# Patient Record
Sex: MALE | Race: WHITE | NOT HISPANIC OR LATINO | URBAN - METROPOLITAN AREA
[De-identification: names, ages, dates, MRNs, and addresses within clinical notes are randomized per-mention and may not be internally consistent; named-entity substitution may affect disease eponyms.]

---

## 2017-08-08 ENCOUNTER — APPOINTMENT (RX ONLY)
Dept: URBAN - METROPOLITAN AREA OTHER 10 | Facility: OTHER | Age: 57
Setting detail: DERMATOLOGY
End: 2017-08-08

## 2017-08-08 DIAGNOSIS — L20.89 OTHER ATOPIC DERMATITIS: ICD-10-CM

## 2017-08-08 PROBLEM — L55.1 SUNBURN OF SECOND DEGREE: Status: ACTIVE | Noted: 2017-08-08

## 2017-08-08 PROBLEM — I10 ESSENTIAL (PRIMARY) HYPERTENSION: Status: ACTIVE | Noted: 2017-08-08

## 2017-08-08 PROBLEM — E13.9 OTHER SPECIFIED DIABETES MELLITUS WITHOUT COMPLICATIONS: Status: ACTIVE | Noted: 2017-08-08

## 2017-08-08 PROBLEM — L20.84 INTRINSIC (ALLERGIC) ECZEMA: Status: ACTIVE | Noted: 2017-08-08

## 2017-08-08 PROCEDURE — 99202 OFFICE O/P NEW SF 15 MIN: CPT

## 2017-08-08 PROCEDURE — ? COUNSELING

## 2017-08-08 PROCEDURE — ? TREATMENT REGIMEN

## 2017-08-08 PROCEDURE — ? PRESCRIPTION

## 2017-08-08 RX ORDER — TRIAMCINOLONE ACETONIDE 1 MG/G
CREAM TOPICAL BID X 2 WEEKS
Qty: 1 | Refills: 1 | Status: ERX | COMMUNITY
Start: 2017-08-08

## 2017-08-08 RX ADMIN — TRIAMCINOLONE ACETONIDE: 1 CREAM TOPICAL at 15:26

## 2017-08-08 ASSESSMENT — LOCATION SIMPLE DESCRIPTION DERM
LOCATION SIMPLE: RIGHT PRETIBIAL REGION
LOCATION SIMPLE: LEFT PRETIBIAL REGION

## 2017-08-08 ASSESSMENT — LOCATION DETAILED DESCRIPTION DERM
LOCATION DETAILED: LEFT PROXIMAL PRETIBIAL REGION
LOCATION DETAILED: RIGHT DISTAL PRETIBIAL REGION

## 2017-08-08 ASSESSMENT — LOCATION ZONE DERM: LOCATION ZONE: LEG

## 2017-08-08 NOTE — PROCEDURE: TREATMENT REGIMEN
Continue Regimen: Triamcinolone creAm
Detail Level: Zone
Otc Regimen: Aveeno lotion\\nCeraVe anti itch

## 2021-08-23 ENCOUNTER — OFFICE VISIT (OUTPATIENT)
Dept: URBAN - METROPOLITAN AREA CLINIC 74 | Facility: CLINIC | Age: 61
End: 2021-08-23

## 2021-08-23 RX ORDER — LISINOPRIL 10 MG/1
1 TABLET TABLET ORAL ONCE A DAY
Status: ACTIVE | COMMUNITY

## 2021-08-23 RX ORDER — GABAPENTIN 600 MG/1
1 TABLET TABLET, FILM COATED ORAL ONCE A DAY
Status: ACTIVE | COMMUNITY

## 2021-08-23 RX ORDER — SITAGLIPTIN 100 MG/1
1 TABLET TABLET, FILM COATED ORAL ONCE A DAY
Status: ACTIVE | COMMUNITY

## 2021-08-23 RX ORDER — METFORMIN HYDROCHLORIDE 1000 MG/1
1 TABLET WITH A MEAL TABLET, FILM COATED ORAL BID
Status: ACTIVE | COMMUNITY

## 2021-10-18 ENCOUNTER — LAB OUTSIDE AN ENCOUNTER (OUTPATIENT)
Dept: URBAN - METROPOLITAN AREA CLINIC 74 | Facility: CLINIC | Age: 61
End: 2021-10-18

## 2021-10-18 ENCOUNTER — OFFICE VISIT (OUTPATIENT)
Dept: URBAN - METROPOLITAN AREA CLINIC 74 | Facility: CLINIC | Age: 61
End: 2021-10-18
Payer: COMMERCIAL

## 2021-10-18 ENCOUNTER — TELEPHONE ENCOUNTER (OUTPATIENT)
Dept: URBAN - METROPOLITAN AREA CLINIC 74 | Facility: CLINIC | Age: 61
End: 2021-10-18

## 2021-10-18 VITALS
SYSTOLIC BLOOD PRESSURE: 140 MMHG | TEMPERATURE: 98.1 F | WEIGHT: 224 LBS | BODY MASS INDEX: 32.07 KG/M2 | HEIGHT: 70 IN | DIASTOLIC BLOOD PRESSURE: 80 MMHG | HEART RATE: 89 BPM

## 2021-10-18 DIAGNOSIS — K74.69 OTHER CIRRHOSIS OF LIVER: ICD-10-CM

## 2021-10-18 DIAGNOSIS — D69.6 THROMBOCYTOPENIA: ICD-10-CM

## 2021-10-18 DIAGNOSIS — R74.8 ABNORMAL LIVER ENZYMES: ICD-10-CM

## 2021-10-18 DIAGNOSIS — K76.0 FATTY LIVER: ICD-10-CM

## 2021-10-18 PROBLEM — 267532001: Status: ACTIVE | Noted: 2021-10-18

## 2021-10-18 PROCEDURE — 99244 OFF/OP CNSLTJ NEW/EST MOD 40: CPT | Performed by: INTERNAL MEDICINE

## 2021-10-18 RX ORDER — METFORMIN HYDROCHLORIDE 1000 MG/1
1 TABLET WITH A MEAL TABLET, FILM COATED ORAL BID
Status: ACTIVE | COMMUNITY

## 2021-10-18 RX ORDER — SITAGLIPTIN 100 MG/1
1 TABLET TABLET, FILM COATED ORAL ONCE A DAY
Status: ACTIVE | COMMUNITY

## 2021-10-18 RX ORDER — LISINOPRIL 10 MG/1
1 TABLET TABLET ORAL ONCE A DAY
Status: ACTIVE | COMMUNITY

## 2021-10-18 RX ORDER — GABAPENTIN 600 MG/1
1 TABLET TABLET, FILM COATED ORAL ONCE A DAY
Status: ACTIVE | COMMUNITY

## 2021-10-18 NOTE — HPI-TODAY'S VISIT:
Pt is referred by Dr. Asher Rosenberg. A copy of this note will be provided for review. Hx cirrhosis.   Portal hypertension.  Recent MRI negative for liver lesions. He was seen at [facility] recently for thrombocytopenia. There is mention of thrombocytopenia, lymphopenia, elevated liver enzymes, and history of fatty liver as well as diabetes.  No prior colon cancer screening, due for colonoscopy.  He was found to have cirrhosis last year during work-up for urology issues. At that time also thrombocytopenia was noted.  Told about 20 years ago and subsequently he had fatty liver. No significant daily alcohol use or illicit drug use. In fact, he has not had alcohol in over 20 years.  I do not have labs to review only  Patient was seen by Dr. Juan Jose De Souza in hematology oncology and referred here for evaluation and work-up. ---- EXAM:  PIC MRI ABDOMEN W+WO IV CONTRAST(CREATININE DRAW IF NEEDED)   CLINICAL INDICATION:  R74.8 (Abnormal levels of other serum enzymes)   abnormal liver function   TECHNIQUE:  Multiplanar multisequence images were obtained through the abdomen before and after the administration of intravenous contrast.   COMPARISON:  CT from 8/3/2020   FINDINGS:    The visualized lung bases are grossly clear. Note, however, that the lungs are suboptimally evaluated on MRI.   The liver demonstrates a nodular surface contour, compatible with underlying cirrhosis. There is a prominent recanalized umbilical vein. The portal vein is patent.   There are no aggressive early arterial enhancing lesions in the liver. There are no aggressive delayed washout lesions in the liver. There are no abnormal areas of restricted diffusion within the liver.   The patient is status post cholecystectomy. There is no significant biliary ductal dilatation.   The spleen is enlarged, measuring 16 cm longitudinally. There are perisplenic and paraesophageal varices.   The kidneys enhance symmetrically. There is no significant hydronephrosis. There is a subcentimeter left renal cyst.   The visualized portions of the abdominal aorta are normal in contour.   Limited views of the bowel show normal caliber small bowel loops. There is scattered colonic diverticulosis. Note that the bowel is not imaged in its entirety on this study of the abdomen and is not optimally assessed by this MR technique.   Evaluation of the osseous structures shows no significant marrow signal abnormality.   IMPRESSION: .         .   Cirrhotic hepatic morphology with no aggressive intrahepatic lesions. Continued interval follow-up is recommended as well as correlation with serum AFP in this high risk patient..   Splenomegaly with upper abdominal varices, compatible with associated portal hypertension.   Diverticulosis.   Please refer to the body of report for further details.   The Results Reporting Office (F1) will complete appropriate follow-up actions based on defined processes. F1   Released By: SHUKRI MORRIS MD 6/29/2021 4:29 PM ---- EXAM:  PH CT ABDOMEN/PELVIS W/O IV CONTRAST   CLINICAL INDICATION:  R31.29 (Other microscopic hematuria)  N20.0 (Calculus of kidney) : Hematuria, unknown cause   TECHNIQUE:  CT scan of the abdomen and pelvis with multiplanar reformatted images generated from the data set without IV contrast. Dose reduction techniques were utilized.    COMPARISON:  10/18/2017   FINDINGS:    Lower chest:  Tiny left lower lobe 2 mm nodule unchanged from 2017 that represent benign micronodule.   Liver:  Nodular contouring involving the liver is redemonstrated similar to 2017.   Gallbladder:  Normal.    Pancreas: Normal.    Spleen:  Spleen measures up to 17 cm in maximum dimension.   Adrenals:  Normal.   Kidneys:  Obstructing 9 mm, 3 mm, and 4 mm stones are seen within the distal left ureter just prior to the ureter vesicular junction resulting in mild left hydroureter without significant hydronephrosis/pelvocaliectasis. Right ureter is normal.  Nonspecific perinephric stranding bilaterally. Nonobstructing 3 mm stone in the upper calyx of the right kidney. Probable small exophytic cyst off the midpole of the left kidney.   Gastrointestinal:  The stomach is normal. The small bowel is normal. Scant amount of pericecal stranding. The presumed appendix appears normal (series 202, image 61); no dilated blind-ending tubular structure identified within the right lower quadrant.  Remainder of the colon appears normal.   Pelvis:  Prostate and seminal vesicles are normal. Small fat-containing inguinal hernias bilaterally.   Vasculature:  No abdominal aortic aneurysm. Varicosities scattered within the upper abdomen.   Lymph nodes:  No enlarged lymph nodes.    Bones:  No destructive osseous lesion. Bilateral pars defects at L5 with grade 1 anterolisthesis of L5 on S1, grossly unchanged from 2017.   IMPRESSION: .         .   1. Obstructing 9 mm, 3 mm, and 4 mm stones within the distal left ureter just prior to the left UVJ resulting in mild left-sided hydroureter without hydronephrosis. No obstructing uropathy identified within the right kidney/ureter.   2. Subtle stranding surrounding the cecum although the presumed appendix appears normal; no dilated blind-ending tubular fluid-filled inflamed structure to suggest appendicitis. Finding may represent nonspecific colitis, mesenteric congestion/edema, or  omental inflammation/infarct.   3. Redemonstration of a nodular liver contour with varices scattered predominantly in the upper abdomen. Overall findings are grossly unchanged from 10/18/2017. Mild splenomegaly. Correlate for cirrhosis.   Released By: JANI EMERSON MD 8/3/2020 5:53 PM

## 2021-10-25 LAB
A/G RATIO: 1.7
AAT, DNA ANALYSIS: (no result)
ACTIN (SMOOTH MUSCLE) ANTIBODY: 5
ADDITIONAL INFORMATION:: (no result)
AFP, SERUM, TUMOR MARKER: 3.5
ALBUMIN: 4.3
ALKALINE PHOSPHATASE: 74
ALT (SGPT): 55
ANA DIRECT: POSITIVE
AST (SGOT): 42
BASO (ABSOLUTE): 0
BASOS: 1
BILIRUBIN, TOTAL: 0.8
BUN/CREATININE RATIO: 14
BUN: 15
CALCIUM: 9.3
CARBON DIOXIDE, TOTAL: 22
CHLORIDE: 104
CREATININE: 1.04
EGFR IF AFRICN AM: 90
EGFR IF NONAFRICN AM: 78
EOS (ABSOLUTE): 0.1
EOS: 2
GLOBULIN, TOTAL: 2.5
GLUCOSE: 256
HBSAG SCREEN: NEGATIVE
HEMATOCRIT: 43.8
HEMATOLOGY COMMENTS:: (no result)
HEMOGLOBIN: 14.3
HEP A AB, IGM: NEGATIVE
HEP B CORE AB, IGM: NEGATIVE
HEP C VIRUS AB: 0.2
HEREDITARY  HEMOCHROMATOSIS: (no result)
IMMATURE CELLS: (no result)
IMMATURE GRANS (ABS): 0
IMMATURE GRANULOCYTES: 0
INR: 1
LYMPHS (ABSOLUTE): 0.5
LYMPHS: 16
Lab: (no result)
MCH: 28.9
MCHC: 32.6
MCV: 89
MITOCHONDRIAL (M2) ANTIBODY: <20
MONOCYTES(ABSOLUTE): 0.3
MONOCYTES: 11
NEUTROPHILS (ABSOLUTE): 2
NEUTROPHILS: 70
NRBC: (no result)
PLATELETS: 89
POTASSIUM: 4.8
PROTEIN, TOTAL: 6.8
PROTHROMBIN TIME: 10.9
RBC: 4.94
RDW: 13.6
SODIUM: 140
WBC: 2.9

## 2021-11-30 ENCOUNTER — CLAIMS CREATED FROM THE CLAIM WINDOW (OUTPATIENT)
Dept: URBAN - METROPOLITAN AREA CLINIC 4 | Facility: CLINIC | Age: 61
End: 2021-11-30
Payer: COMMERCIAL

## 2021-11-30 ENCOUNTER — OFFICE VISIT (OUTPATIENT)
Dept: URBAN - METROPOLITAN AREA SURGERY CENTER 30 | Facility: SURGERY CENTER | Age: 61
End: 2021-11-30
Payer: COMMERCIAL

## 2021-11-30 DIAGNOSIS — K74.69 CIRRHOSIS, CRYPTOGENIC: ICD-10-CM

## 2021-11-30 DIAGNOSIS — I85.10 ESOPH VARICE OTHER DIS: ICD-10-CM

## 2021-11-30 DIAGNOSIS — K31.89 ACQUIRED DEFORMITY OF DUODENUM: ICD-10-CM

## 2021-11-30 DIAGNOSIS — K31.89 DEFORMED PYLORUS, ACQUIRED: ICD-10-CM

## 2021-11-30 PROCEDURE — 88312 SPECIAL STAINS GROUP 1: CPT | Performed by: PATHOLOGY

## 2021-11-30 PROCEDURE — G8907 PT DOC NO EVENTS ON DISCHARG: HCPCS | Performed by: INTERNAL MEDICINE

## 2021-11-30 PROCEDURE — 43239 EGD BIOPSY SINGLE/MULTIPLE: CPT | Performed by: INTERNAL MEDICINE

## 2021-11-30 PROCEDURE — 88305 TISSUE EXAM BY PATHOLOGIST: CPT | Performed by: PATHOLOGY

## 2021-12-15 ENCOUNTER — WEB ENCOUNTER (OUTPATIENT)
Dept: URBAN - METROPOLITAN AREA CLINIC 74 | Facility: CLINIC | Age: 61
End: 2021-12-15

## 2021-12-15 ENCOUNTER — OFFICE VISIT (OUTPATIENT)
Dept: URBAN - METROPOLITAN AREA CLINIC 74 | Facility: CLINIC | Age: 61
End: 2021-12-15
Payer: COMMERCIAL

## 2021-12-15 DIAGNOSIS — R16.1 SPLENOMEGALY: ICD-10-CM

## 2021-12-15 DIAGNOSIS — D69.6 THROMBOCYTOPENIA: ICD-10-CM

## 2021-12-15 DIAGNOSIS — K76.0 FATTY LIVER: ICD-10-CM

## 2021-12-15 DIAGNOSIS — R74.8 ABNORMAL LIVER ENZYMES: ICD-10-CM

## 2021-12-15 DIAGNOSIS — K74.69 OTHER CIRRHOSIS OF LIVER: ICD-10-CM

## 2021-12-15 DIAGNOSIS — Z12.11 COLON CANCER SCREENING: ICD-10-CM

## 2021-12-15 DIAGNOSIS — K76.6 PORTAL HYPERTENSION: ICD-10-CM

## 2021-12-15 DIAGNOSIS — I86.8 VARICES OF OTHER SITES: ICD-10-CM

## 2021-12-15 DIAGNOSIS — K76.9 LIVER DISEASE: ICD-10-CM

## 2021-12-15 PROCEDURE — 99213 OFFICE O/P EST LOW 20 MIN: CPT | Performed by: INTERNAL MEDICINE

## 2021-12-15 RX ORDER — LISINOPRIL 10 MG/1
1 TABLET TABLET ORAL ONCE A DAY
Status: ACTIVE | COMMUNITY

## 2021-12-15 RX ORDER — SITAGLIPTIN 100 MG/1
1 TABLET TABLET, FILM COATED ORAL ONCE A DAY
Status: ACTIVE | COMMUNITY

## 2021-12-15 RX ORDER — METFORMIN HYDROCHLORIDE 1000 MG/1
1 TABLET WITH A MEAL TABLET, FILM COATED ORAL BID
Status: ACTIVE | COMMUNITY

## 2021-12-15 RX ORDER — GABAPENTIN 600 MG/1
1 TABLET TABLET, FILM COATED ORAL ONCE A DAY
Status: ACTIVE | COMMUNITY

## 2021-12-15 RX ORDER — NADOLOL 20 MG/1
1 TABLET TABLET ORAL
Qty: 90 TABLETS | Refills: 3 | OUTPATIENT
Start: 2021-12-15

## 2021-12-15 NOTE — HPI-TODAY'S VISIT:
AST 42 ALT 55 CMP otherwise normal. CLD panel negative. Hepatitis panel negative. MELD 8 Plt 89 Hgb normal. EGD: Grade 1 esophageal varices, mild gastritis, bx negative for H.pylori. Pt is referred by Dr. Asher Rosenberg. A copy of this note will be provided for review. Hx cirrhosis.   Portal hypertension.  Recent MRI negative for liver lesions.  No prior colon cancer screening, due for colonoscopy. This was scheduled but he canceled procedure.   He was found to have cirrhosis last year during work-up for urology issues. At that time also thrombocytopenia was noted.  Told about 20 years ago and subsequently he had fatty liver. No significant daily alcohol use or illicit drug use. In fact, he has not had alcohol in over 20 years.  I do not have labs to review only  Patient was seen by Dr. Juan Jose De Souza in hematology oncology and referred here for evaluation and work-up. ---- EXAM:  PIC MRI ABDOMEN W+WO IV CONTRAST(CREATININE DRAW IF NEEDED)   CLINICAL INDICATION:  R74.8 (Abnormal levels of other serum enzymes)   abnormal liver function   TECHNIQUE:  Multiplanar multisequence images were obtained through the abdomen before and after the administration of intravenous contrast.   COMPARISON:  CT from 8/3/2020   FINDINGS:    The visualized lung bases are grossly clear. Note, however, that the lungs are suboptimally evaluated on MRI.   The liver demonstrates a nodular surface contour, compatible with underlying cirrhosis. There is a prominent recanalized umbilical vein. The portal vein is patent.   There are no aggressive early arterial enhancing lesions in the liver. There are no aggressive delayed washout lesions in the liver. There are no abnormal areas of restricted diffusion within the liver.   The patient is status post cholecystectomy. There is no significant biliary ductal dilatation.   The spleen is enlarged, measuring 16 cm longitudinally. There are perisplenic and paraesophageal varices.   The kidneys enhance symmetrically. There is no significant hydronephrosis. There is a subcentimeter left renal cyst.   The visualized portions of the abdominal aorta are normal in contour.   Limited views of the bowel show normal caliber small bowel loops. There is scattered colonic diverticulosis. Note that the bowel is not imaged in its entirety on this study of the abdomen and is not optimally assessed by this MR technique.   Evaluation of the osseous structures shows no significant marrow signal abnormality.   IMPRESSION: .         .   Cirrhotic hepatic morphology with no aggressive intrahepatic lesions. Continued interval follow-up is recommended as well as correlation with serum AFP in this high risk patient..   Splenomegaly with upper abdominal varices, compatible with associated portal hypertension.   Diverticulosis.   Please refer to the body of report for further details.   The Results Reporting Office (F1) will complete appropriate follow-up actions based on defined processes. F1   Released By: SHUKRI MORRIS MD 6/29/2021 4:29 PM ---- EXAM:  PH CT ABDOMEN/PELVIS W/O IV CONTRAST   CLINICAL INDICATION:  R31.29 (Other microscopic hematuria)  N20.0 (Calculus of kidney) : Hematuria, unknown cause   TECHNIQUE:  CT scan of the abdomen and pelvis with multiplanar reformatted images generated from the data set without IV contrast. Dose reduction techniques were utilized.    COMPARISON:  10/18/2017   FINDINGS:    Lower chest:  Tiny left lower lobe 2 mm nodule unchanged from 2017 that represent benign micronodule.   Liver:  Nodular contouring involving the liver is redemonstrated similar to 2017.   Gallbladder:  Normal.    Pancreas: Normal.    Spleen:  Spleen measures up to 17 cm in maximum dimension.   Adrenals:  Normal.   Kidneys:  Obstructing 9 mm, 3 mm, and 4 mm stones are seen within the distal left ureter just prior to the ureter vesicular junction resulting in mild left hydroureter without significant hydronephrosis/pelvocaliectasis. Right ureter is normal.  Nonspecific perinephric stranding bilaterally. Nonobstructing 3 mm stone in the upper calyx of the right kidney. Probable small exophytic cyst off the midpole of the left kidney.   Gastrointestinal:  The stomach is normal. The small bowel is normal. Scant amount of pericecal stranding. The presumed appendix appears normal (series 202, image 61); no dilated blind-ending tubular structure identified within the right lower quadrant.  Remainder of the colon appears normal.   Pelvis:  Prostate and seminal vesicles are normal. Small fat-containing inguinal hernias bilaterally.   Vasculature:  No abdominal aortic aneurysm. Varicosities scattered within the upper abdomen.   Lymph nodes:  No enlarged lymph nodes.    Bones:  No destructive osseous lesion. Bilateral pars defects at L5 with grade 1 anterolisthesis of L5 on S1, grossly unchanged from 2017.   IMPRESSION: .         .   1. Obstructing 9 mm, 3 mm, and 4 mm stones within the distal left ureter just prior to the left UVJ resulting in mild left-sided hydroureter without hydronephrosis. No obstructing uropathy identified within the right kidney/ureter.   2. Subtle stranding surrounding the cecum although the presumed appendix appears normal; no dilated blind-ending tubular fluid-filled inflamed structure to suggest appendicitis. Finding may represent nonspecific colitis, mesenteric congestion/edema, or  omental inflammation/infarct.   3. Redemonstration of a nodular liver contour with varices scattered predominantly in the upper abdomen. Overall findings are grossly unchanged from 10/18/2017. Mild splenomegaly. Correlate for cirrhosis.   Released By: JANI EMERSON MD 8/3/2020 5:53 PM

## 2022-06-15 ENCOUNTER — OFFICE VISIT (OUTPATIENT)
Dept: URBAN - METROPOLITAN AREA CLINIC 74 | Facility: CLINIC | Age: 62
End: 2022-06-15

## 2022-06-21 ENCOUNTER — DASHBOARD ENCOUNTERS (OUTPATIENT)
Age: 62
End: 2022-06-21

## 2022-06-27 ENCOUNTER — LAB OUTSIDE AN ENCOUNTER (OUTPATIENT)
Dept: URBAN - METROPOLITAN AREA CLINIC 74 | Facility: CLINIC | Age: 62
End: 2022-06-27

## 2022-06-27 ENCOUNTER — OFFICE VISIT (OUTPATIENT)
Dept: URBAN - METROPOLITAN AREA CLINIC 74 | Facility: CLINIC | Age: 62
End: 2022-06-27
Payer: COMMERCIAL

## 2022-06-27 VITALS
BODY MASS INDEX: 30.07 KG/M2 | WEIGHT: 214.8 LBS | TEMPERATURE: 98.1 F | HEART RATE: 67 BPM | HEIGHT: 71 IN | SYSTOLIC BLOOD PRESSURE: 138 MMHG | DIASTOLIC BLOOD PRESSURE: 88 MMHG

## 2022-06-27 DIAGNOSIS — K74.69 OTHER CIRRHOSIS OF LIVER: ICD-10-CM

## 2022-06-27 DIAGNOSIS — D69.6 THROMBOCYTOPENIA: ICD-10-CM

## 2022-06-27 DIAGNOSIS — K76.6 PORTAL HYPERTENSION: ICD-10-CM

## 2022-06-27 DIAGNOSIS — K76.9 LIVER DISEASE: ICD-10-CM

## 2022-06-27 DIAGNOSIS — Z12.11 COLON CANCER SCREENING: ICD-10-CM

## 2022-06-27 DIAGNOSIS — K76.0 FATTY LIVER: ICD-10-CM

## 2022-06-27 DIAGNOSIS — R16.1 SPLENOMEGALY: ICD-10-CM

## 2022-06-27 DIAGNOSIS — R74.8 ABNORMAL LIVER ENZYMES: ICD-10-CM

## 2022-06-27 DIAGNOSIS — I86.8 VARICES OF OTHER SITES: ICD-10-CM

## 2022-06-27 PROBLEM — 415116008: Status: ACTIVE | Noted: 2021-10-18

## 2022-06-27 PROBLEM — 235856003 LIVER DISEASE: Status: ACTIVE | Noted: 2021-10-18

## 2022-06-27 PROBLEM — 34742003: Status: ACTIVE | Noted: 2021-10-18

## 2022-06-27 PROBLEM — 197321007 FATTY LIVER: Status: ACTIVE | Noted: 2021-10-18

## 2022-06-27 PROCEDURE — 99213 OFFICE O/P EST LOW 20 MIN: CPT | Performed by: INTERNAL MEDICINE

## 2022-06-27 RX ORDER — GABAPENTIN 600 MG/1
1 TABLET TABLET, FILM COATED ORAL ONCE A DAY
Status: ACTIVE | COMMUNITY

## 2022-06-27 RX ORDER — NADOLOL 20 MG/1
1 TABLET TABLET ORAL
Qty: 90 TABLETS | Refills: 3 | OUTPATIENT

## 2022-06-27 RX ORDER — METFORMIN HYDROCHLORIDE 1000 MG/1
1 TABLET WITH A MEAL TABLET, FILM COATED ORAL BID
Status: ACTIVE | COMMUNITY

## 2022-06-27 RX ORDER — LISINOPRIL 10 MG/1
1 TABLET TABLET ORAL ONCE A DAY
Status: ACTIVE | COMMUNITY

## 2022-06-27 RX ORDER — SODIUM SULFATE, MAGNESIUM SULFATE, AND POTASSIUM CHLORIDE 17.75; 2.7; 2.25 G/1; G/1; G/1
12 TABLETS TABLET ORAL
Qty: 24 TABLETS | Refills: 0 | OUTPATIENT
Start: 2022-06-27 | End: 2022-06-28

## 2022-06-27 RX ORDER — SITAGLIPTIN 100 MG/1
1 TABLET TABLET, FILM COATED ORAL ONCE A DAY
Status: ACTIVE | COMMUNITY

## 2022-06-27 RX ORDER — NADOLOL 20 MG/1
1 TABLET TABLET ORAL
Qty: 90 TABLETS | Refills: 3 | Status: ACTIVE | COMMUNITY
Start: 2021-12-15

## 2022-06-27 NOTE — HPI-TODAY'S VISIT:
CIrrhosis evaluation, 6 month surveillance visit.  ==== Prior note 12/21: AST 42 ALT 55 CMP otherwise normal. CLD panel negative. Hepatitis panel negative. MELD 8 Plt 89 Hgb normal. EGD: Grade 1 esophageal varices, mild gastritis, bx negative for H.pylori. Pt is referred by Dr. Asher Rosenberg. A copy of this note will be provided for review. Hx cirrhosis.   Portal hypertension.  Recent MRI negative for liver lesions.  No prior colon cancer screening, due for colonoscopy. This was scheduled but he canceled procedure.   He was found to have cirrhosis last year during work-up for urology issues. At that time also thrombocytopenia was noted.  Told about 20 years ago and subsequently he had fatty liver. No significant daily alcohol use or illicit drug use. In fact, he has not had alcohol in over 20 years.  I do not have labs to review only

## 2022-06-27 NOTE — PHYSICAL EXAM EYES:
Conjuntivae and eyelids appear normal,  Sclerae : White without injection
How Severe Is Your Acne?: mild
Is This A New Presentation, Or A Follow-Up?: Acne

## 2022-06-28 PROBLEM — 19943007: Status: ACTIVE | Noted: 2021-10-18

## 2022-06-28 LAB
A/G RATIO: 2
ALBUMIN: 4.3
ALKALINE PHOSPHATASE: 66
ALT (SGPT): 50
AST (SGOT): 40
BILIRUBIN, TOTAL: 0.9
BUN/CREATININE RATIO: 14
BUN: 14
CALCIUM: 9
CARBON DIOXIDE, TOTAL: 22
CHLORIDE: 105
CREATININE: 0.98
EGFR: 88
GLOBULIN, TOTAL: 2.1
GLUCOSE: 204
HEMATOCRIT: 42.5
HEMATOLOGY COMMENTS:: (no result)
HEMOGLOBIN: 13.6
INR: 1
MCH: 28.9
MCHC: 32
MCV: 90
NRBC: (no result)
PLATELETS: 83
POTASSIUM: 4.5
PROTEIN, TOTAL: 6.4
PROTHROMBIN TIME: 10.7
RBC: 4.71
RDW: 13.9
SODIUM: 141
WBC: 3.3

## 2022-07-26 ENCOUNTER — CLAIMS CREATED FROM THE CLAIM WINDOW (OUTPATIENT)
Dept: URBAN - METROPOLITAN AREA CLINIC 73 | Facility: CLINIC | Age: 62
End: 2022-07-26
Payer: COMMERCIAL

## 2022-07-26 ENCOUNTER — OFFICE VISIT (OUTPATIENT)
Dept: URBAN - METROPOLITAN AREA CLINIC 73 | Facility: CLINIC | Age: 62
End: 2022-07-26

## 2022-07-26 ENCOUNTER — CLAIMS CREATED FROM THE CLAIM WINDOW (OUTPATIENT)
Dept: URBAN - METROPOLITAN AREA CLINIC 73 | Facility: CLINIC | Age: 62
End: 2022-07-26

## 2022-07-26 DIAGNOSIS — R16.1 SPLENOMEGALY: ICD-10-CM

## 2022-07-26 DIAGNOSIS — K74.69 CIRRHOSIS, CRYPTOGENIC: ICD-10-CM

## 2022-07-26 PROCEDURE — 93975 VASCULAR STUDY: CPT | Performed by: INTERNAL MEDICINE

## 2022-07-26 PROCEDURE — 76705 ECHO EXAM OF ABDOMEN: CPT | Performed by: INTERNAL MEDICINE

## 2022-07-26 RX ORDER — LISINOPRIL 10 MG/1
1 TABLET TABLET ORAL ONCE A DAY
Status: ACTIVE | COMMUNITY

## 2022-07-26 RX ORDER — GABAPENTIN 600 MG/1
1 TABLET TABLET, FILM COATED ORAL ONCE A DAY
Status: ACTIVE | COMMUNITY

## 2022-07-26 RX ORDER — NADOLOL 20 MG/1
1 TABLET TABLET ORAL
Qty: 90 TABLETS | Refills: 3 | Status: ACTIVE | COMMUNITY

## 2022-07-26 RX ORDER — METFORMIN HYDROCHLORIDE 1000 MG/1
1 TABLET WITH A MEAL TABLET, FILM COATED ORAL BID
Status: ACTIVE | COMMUNITY

## 2022-07-26 RX ORDER — SITAGLIPTIN 100 MG/1
1 TABLET TABLET, FILM COATED ORAL ONCE A DAY
Status: ACTIVE | COMMUNITY